# Patient Record
Sex: MALE | Race: BLACK OR AFRICAN AMERICAN | Employment: UNEMPLOYED | URBAN - METROPOLITAN AREA
[De-identification: names, ages, dates, MRNs, and addresses within clinical notes are randomized per-mention and may not be internally consistent; named-entity substitution may affect disease eponyms.]

---

## 2024-04-13 ENCOUNTER — HOSPITAL ENCOUNTER (EMERGENCY)
Age: 19
Discharge: HOME | End: 2024-04-13
Attending: EMERGENCY MEDICINE
Payer: COMMERCIAL

## 2024-04-13 VITALS
RESPIRATION RATE: 16 BRPM | HEART RATE: 84 BPM | SYSTOLIC BLOOD PRESSURE: 134 MMHG | HEIGHT: 72 IN | BODY MASS INDEX: 33.53 KG/M2 | WEIGHT: 247.58 LBS | OXYGEN SATURATION: 99 % | TEMPERATURE: 99.3 F | DIASTOLIC BLOOD PRESSURE: 64 MMHG

## 2024-04-13 DIAGNOSIS — Z76.0 ENCOUNTER FOR MEDICATION REFILL: Primary | ICD-10-CM

## 2024-04-13 PROCEDURE — 99283 EMERGENCY DEPT VISIT LOW MDM: CPT

## 2024-04-13 RX ORDER — BENZTROPINE MESYLATE 1 MG/1
1 TABLET ORAL 2 TIMES DAILY
Qty: 28 TABLET | Refills: 3 | Status: SHIPPED | OUTPATIENT
Start: 2024-04-13 | End: 2024-04-13

## 2024-04-13 RX ORDER — RISPERIDONE 3 MG/1
3 TABLET ORAL 2 TIMES DAILY
COMMUNITY
Start: 2023-07-24 | End: 2024-04-13

## 2024-04-13 RX ORDER — LAMOTRIGINE 100 MG/1
200 TABLET ORAL DAILY
Qty: 14 TABLET | Refills: 0 | Status: SHIPPED | OUTPATIENT
Start: 2024-04-13

## 2024-04-13 RX ORDER — HYDROXYZINE 50 MG/1
50 TABLET, FILM COATED ORAL EVERY 6 HOURS PRN
COMMUNITY
Start: 2024-01-19 | End: 2024-04-13

## 2024-04-13 RX ORDER — RISPERIDONE 3 MG/1
3 TABLET ORAL 2 TIMES DAILY
Qty: 28 TABLET | Refills: 0 | Status: SHIPPED | OUTPATIENT
Start: 2024-04-13 | End: 2024-04-13

## 2024-04-13 RX ORDER — HYDROXYZINE 50 MG/1
50 TABLET, FILM COATED ORAL EVERY 6 HOURS PRN
Qty: 30 TABLET | Refills: 0 | Status: SHIPPED | OUTPATIENT
Start: 2024-04-13

## 2024-04-13 RX ORDER — ARIPIPRAZOLE 5 MG/1
10 TABLET ORAL DAILY
Qty: 14 TABLET | Refills: 0 | Status: SHIPPED | OUTPATIENT
Start: 2024-04-13

## 2024-04-13 RX ORDER — DIVALPROEX SODIUM 500 MG/1
500 TABLET, DELAYED RELEASE ORAL 3 TIMES DAILY
COMMUNITY
Start: 2023-07-24 | End: 2024-04-13

## 2024-04-13 RX ORDER — LITHIUM CARBONATE 300 MG/1
300 CAPSULE ORAL 2 TIMES DAILY WITH MEALS
COMMUNITY
Start: 2023-07-24 | End: 2024-04-13

## 2024-04-13 RX ORDER — LITHIUM CARBONATE 300 MG/1
300 CAPSULE ORAL 2 TIMES DAILY WITH MEALS
Qty: 28 CAPSULE | Refills: 0 | Status: SHIPPED | OUTPATIENT
Start: 2024-04-13

## 2024-04-13 RX ORDER — DIVALPROEX SODIUM 500 MG/1
500 TABLET, DELAYED RELEASE ORAL 3 TIMES DAILY
Qty: 42 TABLET | Refills: 0 | Status: SHIPPED | OUTPATIENT
Start: 2024-04-13 | End: 2024-04-13

## 2024-04-13 NOTE — ED TRIAGE NOTES
Amilify 10 mg; lithium 600 mg, limactal 200 mg.  Out x 3 days.    Patient to ED for medication refill.  Patient is alert and oriented with GCS 15.  Patient recently moved form [place], has hx Mental health and has been out of medications x 3 days.  Patient denies any SI or HI and just needs to have his medications refilled.  No complaints at this time.

## 2024-04-19 NOTE — ED PROVIDER NOTES
[facility]  EMERGENCY DEPARTMENT ENCOUNTER        Pt Name: Rashid Foley  MRN: [id number]  Birthdate 2005  Date of evaluation: 4/13/2024  Provider: Abhinav Cain MD  PCP: No primary care provider on file.  Note Started: 2:24 AM EDT 4/19/24    CHIEF COMPLAINT       Chief Complaint   Patient presents with    Medication Refill       HISTORY OF PRESENT ILLNESS: 1 or more Elements   History From: Patient        Rashid Foley is a 18 y.o. male who presents for evaluation of medication refill.  Patient recent moved from [place] to [place].  He states that he is taking Abilify, lithium Lamictal.  He states he did not establish care with a provider here.  He did not speak to his previous provider to see if they given referral.  He denies thoughts of self-harm.  Denies auditory visualizations.  He reports he does feel safe at home.     Nursing Notes were all reviewed and agreed with or any disagreements were addressed in the HPI.    REVIEW OF SYSTEMS :      Review of Systems    Positives and Pertinent negatives as per HPI.     SURGICAL HISTORY   History reviewed. No pertinent surgical history.    CURRENTMEDICATIONS       Discharge Medication List as of 4/13/2024  8:10 PM          ALLERGIES     Patient has no known allergies.    FAMILYHISTORY     History reviewed. No pertinent family history.     SOCIAL HISTORY       Social History     Tobacco Use    Smoking status: Every Day     Current packs/day: 0.50     Types: Cigarettes    Smokeless tobacco: Never   Substance Use Topics    Alcohol use: Not Currently    Drug use: Never       SCREENINGS        Librado Coma Scale  Eye Opening: Spontaneous  Best Verbal Response: Oriented  Best Motor Response: Obeys commands  Librado Coma Scale Score: 15                CIWA Assessment  BP: 134/64  Pulse: 84           PHYSICAL EXAM  1 or more Elements     ED Triage Vitals   BP Temp Temp Source Pulse Respirations SpO2 Height Weight - Scale   04/13/24 3522

## 2024-07-13 PROCEDURE — 80307 DRUG TEST PRSMV CHEM ANLYZR: CPT | Mod: OUT | Performed by: NURSE PRACTITIONER

## 2024-07-13 PROCEDURE — 80355 GABAPENTIN NON-BLOOD: CPT | Mod: OUT | Performed by: NURSE PRACTITIONER

## 2024-07-14 ENCOUNTER — LAB REQUISITION (OUTPATIENT)
Dept: LAB | Facility: HOSPITAL | Age: 19
End: 2024-07-14
Payer: COMMERCIAL

## 2024-07-14 ENCOUNTER — PHARMACY VISIT (OUTPATIENT)
Dept: PHARMACY | Facility: CLINIC | Age: 19
End: 2024-07-14
Payer: MEDICAID

## 2024-07-14 DIAGNOSIS — Z20.828 CONTACT WITH AND (SUSPECTED) EXPOSURE TO OTHER VIRAL COMMUNICABLE DISEASES: ICD-10-CM

## 2024-07-14 DIAGNOSIS — Z00.00 ENCOUNTER FOR GENERAL ADULT MEDICAL EXAMINATION WITHOUT ABNORMAL FINDINGS: ICD-10-CM

## 2024-07-14 LAB
AMPHETAMINES UR QL SCN: ABNORMAL
BARBITURATES UR QL SCN: ABNORMAL
BENZODIAZ UR QL SCN: ABNORMAL
BZE UR QL SCN: ABNORMAL
CANNABINOIDS UR QL SCN: ABNORMAL
FENTANYL+NORFENTANYL UR QL SCN: ABNORMAL
METHADONE UR QL SCN: ABNORMAL
OPIATES UR QL SCN: ABNORMAL
OXYCODONE+OXYMORPHONE UR QL SCN: ABNORMAL
PCP UR QL SCN: ABNORMAL

## 2024-07-14 PROCEDURE — RXMED WILLOW AMBULATORY MEDICATION CHARGE

## 2024-07-14 RX ORDER — LORAZEPAM 1 MG/1
TABLET ORAL
Qty: 12 TABLET | Refills: 0 | OUTPATIENT
Start: 2024-07-14

## 2024-07-14 RX ORDER — DIPHENHYDRAMINE HCL 25 MG
CAPSULE ORAL
Qty: 12 CAPSULE | Refills: 0 | OUTPATIENT
Start: 2024-07-14

## 2024-07-14 RX ORDER — CHLORDIAZEPOXIDE HYDROCHLORIDE 25 MG/1
CAPSULE, GELATIN COATED ORAL
Qty: 22 CAPSULE | Refills: 0 | OUTPATIENT
Start: 2024-07-14

## 2024-07-14 RX ORDER — CLONIDINE HYDROCHLORIDE 0.1 MG/1
TABLET ORAL
Qty: 20 TABLET | Refills: 0 | OUTPATIENT
Start: 2024-07-14

## 2024-07-14 RX ORDER — ONDANSETRON 4 MG/1
TABLET, ORALLY DISINTEGRATING ORAL
Qty: 10 TABLET | Refills: 0 | OUTPATIENT
Start: 2024-07-14

## 2024-07-14 RX ORDER — TRAZODONE HYDROCHLORIDE 50 MG/1
TABLET ORAL
Qty: 10 TABLET | Refills: 0 | OUTPATIENT
Start: 2024-07-14

## 2024-07-14 RX ORDER — DICYCLOMINE HYDROCHLORIDE 20 MG/1
TABLET ORAL
Qty: 10 TABLET | Refills: 0 | OUTPATIENT
Start: 2024-07-14

## 2024-07-14 RX ORDER — LANOLIN ALCOHOL/MO/W.PET/CERES
CREAM (GRAM) TOPICAL
Qty: 60 TABLET | Refills: 0 | OUTPATIENT
Start: 2024-07-14

## 2024-07-14 RX ORDER — HYDROXYZINE HYDROCHLORIDE 50 MG/1
TABLET, FILM COATED ORAL
Qty: 30 TABLET | Refills: 0 | OUTPATIENT
Start: 2024-07-14

## 2024-07-14 RX ORDER — MICONAZOLE NITRATE 2 %
CREAM (GRAM) TOPICAL
Qty: 20 EACH | Refills: 0 | OUTPATIENT
Start: 2024-07-14

## 2024-07-14 RX ORDER — PROMETHAZINE HYDROCHLORIDE 12.5 MG/1
TABLET ORAL
Qty: 20 TABLET | Refills: 0 | OUTPATIENT
Start: 2024-07-14

## 2024-07-14 RX ORDER — FOLIC ACID 1 MG/1
TABLET ORAL
Qty: 60 TABLET | Refills: 0 | OUTPATIENT
Start: 2024-07-14

## 2024-07-16 LAB
BUPRENORPHINE SCREEN, URINE: NEGATIVE NG/ML
DRUG SCREEN COMMENT UR-IMP: NORMAL

## 2024-07-19 LAB — GABAPENTIN UR-MCNC: <5 UG/ML

## 2024-07-31 ENCOUNTER — HOSPITAL ENCOUNTER (EMERGENCY)
Age: 19
Discharge: PSYCHIATRIC OR SUBSTANCE USE FACILITY | End: 2024-08-01
Attending: EMERGENCY MEDICINE
Payer: COMMERCIAL

## 2024-07-31 DIAGNOSIS — R45.851 DEPRESSION WITH SUICIDAL IDEATION: Primary | ICD-10-CM

## 2024-07-31 DIAGNOSIS — F32.A DEPRESSION WITH SUICIDAL IDEATION: Primary | ICD-10-CM

## 2024-07-31 LAB
ALBUMIN SERPL-MCNC: 3.9 G/DL (ref 3.5–5.2)
ALP SERPL-CCNC: 109 U/L (ref 40–129)
ALT SERPL-CCNC: 23 U/L (ref 0–40)
AMPHET UR QL SCN: NEGATIVE
ANION GAP SERPL CALCULATED.3IONS-SCNC: 11 MMOL/L (ref 7–16)
APAP SERPL-MCNC: <5 UG/ML (ref 10–30)
AST SERPL-CCNC: 23 U/L (ref 0–39)
BARBITURATES UR QL SCN: NEGATIVE
BASOPHILS # BLD: 0.04 K/UL (ref 0–0.2)
BASOPHILS NFR BLD: 1 % (ref 0–2)
BENZODIAZ UR QL: POSITIVE
BILIRUB SERPL-MCNC: 0.4 MG/DL (ref 0–1.2)
BILIRUB UR QL STRIP: NEGATIVE
BUN SERPL-MCNC: 9 MG/DL (ref 6–20)
BUPRENORPHINE UR QL: NEGATIVE
CALCIUM SERPL-MCNC: 9 MG/DL (ref 8.6–10.2)
CANNABINOIDS UR QL SCN: POSITIVE
CHLORIDE SERPL-SCNC: 105 MMOL/L (ref 98–107)
CLARITY UR: CLEAR
CO2 SERPL-SCNC: 23 MMOL/L (ref 22–29)
COCAINE UR QL SCN: NEGATIVE
COLOR UR: YELLOW
CREAT SERPL-MCNC: 1 MG/DL (ref 0.7–1.2)
EKG ATRIAL RATE: 58 BPM
EKG P AXIS: 37 DEGREES
EKG P-R INTERVAL: 136 MS
EKG Q-T INTERVAL: 404 MS
EKG QRS DURATION: 76 MS
EKG QTC CALCULATION (BAZETT): 396 MS
EKG R AXIS: 88 DEGREES
EKG T AXIS: 51 DEGREES
EKG VENTRICULAR RATE: 58 BPM
EOSINOPHIL # BLD: 0.02 K/UL (ref 0.05–0.5)
EOSINOPHILS RELATIVE PERCENT: 0 % (ref 0–6)
ERYTHROCYTE [DISTWIDTH] IN BLOOD BY AUTOMATED COUNT: 14.3 % (ref 11.5–15)
ETHANOLAMINE SERPL-MCNC: <10 MG/DL (ref 0–0.08)
FENTANYL UR QL: NEGATIVE
GFR, ESTIMATED: >90 ML/MIN/1.73M2
GLUCOSE SERPL-MCNC: 95 MG/DL (ref 74–99)
GLUCOSE UR STRIP-MCNC: NEGATIVE MG/DL
HCT VFR BLD AUTO: 42.5 % (ref 37–54)
HGB BLD-MCNC: 13.3 G/DL (ref 12.5–16.5)
HGB UR QL STRIP.AUTO: NEGATIVE
IMM GRANULOCYTES # BLD AUTO: <0.03 K/UL (ref 0–0.58)
IMM GRANULOCYTES NFR BLD: 0 % (ref 0–5)
KETONES UR STRIP-MCNC: 15 MG/DL
LEUKOCYTE ESTERASE UR QL STRIP: NEGATIVE
LITHIUM DATE LAST DOSE: ABNORMAL
LITHIUM DOSE AMOUNT: ABNORMAL
LITHIUM DOSE TIME: ABNORMAL
LITHIUM LEVEL: <0.1 MMOL/L (ref 0.5–1.5)
LYMPHOCYTES NFR BLD: 1.68 K/UL (ref 1.5–4)
LYMPHOCYTES RELATIVE PERCENT: 28 % (ref 20–42)
MCH RBC QN AUTO: 26.1 PG (ref 26–35)
MCHC RBC AUTO-ENTMCNC: 31.3 G/DL (ref 32–34.5)
MCV RBC AUTO: 83.3 FL (ref 80–99.9)
METHADONE UR QL: NEGATIVE
MONOCYTES NFR BLD: 0.58 K/UL (ref 0.1–0.95)
MONOCYTES NFR BLD: 10 % (ref 2–12)
NEUTROPHILS NFR BLD: 61 % (ref 43–80)
NEUTS SEG NFR BLD: 3.58 K/UL (ref 1.8–7.3)
NITRITE UR QL STRIP: NEGATIVE
OPIATES UR QL SCN: NEGATIVE
OXYCODONE UR QL SCN: NEGATIVE
PCP UR QL SCN: NEGATIVE
PH UR STRIP: 6 [PH] (ref 5–9)
PLATELET # BLD AUTO: 289 K/UL (ref 130–450)
PMV BLD AUTO: 10.3 FL (ref 7–12)
POTASSIUM SERPL-SCNC: 4.3 MMOL/L (ref 3.5–5)
PROT SERPL-MCNC: 7.2 G/DL (ref 6.4–8.3)
PROT UR STRIP-MCNC: 30 MG/DL
RBC # BLD AUTO: 5.1 M/UL (ref 3.8–5.8)
RBC #/AREA URNS HPF: NORMAL /HPF
SALICYLATES SERPL-MCNC: <0.3 MG/DL (ref 0–30)
SODIUM SERPL-SCNC: 139 MMOL/L (ref 132–146)
SP GR UR STRIP: >1.03 (ref 1–1.03)
TEST INFORMATION: ABNORMAL
TOXIC TRICYCLIC SC,BLOOD: NEGATIVE
UROBILINOGEN UR STRIP-ACNC: 1 EU/DL (ref 0–1)
WBC #/AREA URNS HPF: NORMAL /HPF
WBC OTHER # BLD: 5.9 K/UL (ref 4.5–11.5)

## 2024-07-31 PROCEDURE — 80143 DRUG ASSAY ACETAMINOPHEN: CPT

## 2024-07-31 PROCEDURE — 80307 DRUG TEST PRSMV CHEM ANLYZR: CPT

## 2024-07-31 PROCEDURE — G0480 DRUG TEST DEF 1-7 CLASSES: HCPCS

## 2024-07-31 PROCEDURE — 80179 DRUG ASSAY SALICYLATE: CPT

## 2024-07-31 PROCEDURE — 85025 COMPLETE CBC W/AUTO DIFF WBC: CPT

## 2024-07-31 PROCEDURE — 93005 ELECTROCARDIOGRAM TRACING: CPT | Performed by: EMERGENCY MEDICINE

## 2024-07-31 PROCEDURE — 80178 ASSAY OF LITHIUM: CPT

## 2024-07-31 PROCEDURE — 99285 EMERGENCY DEPT VISIT HI MDM: CPT

## 2024-07-31 PROCEDURE — 81001 URINALYSIS AUTO W/SCOPE: CPT

## 2024-07-31 PROCEDURE — 93010 ELECTROCARDIOGRAM REPORT: CPT | Performed by: INTERNAL MEDICINE

## 2024-07-31 PROCEDURE — 80053 COMPREHEN METABOLIC PANEL: CPT
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
## 2024-07-31 NOTE — ED PROVIDER NOTES
use/arrhythmia.  Workup noted.  Medically for social work eval for psych admit      -------------------------------------------------- RESULTS -------------------------------------------------  I have personally reviewed all laboratory and imaging results for this patient. Results are listed below.     LABS:  Results for orders placed or performed during the hospital encounter of 07/31/24   CBC with Auto Differential   Result Value Ref Range    WBC 5.9 4.5 - 11.5 k/uL    RBC 5.10 3.80 - 5.80 m/uL    Hemoglobin 13.3 12.5 - 16.5 g/dL    Hematocrit 42.5 37.0 - 54.0 %    MCV 83.3 80.0 - 99.9 fL    MCH 26.1 26.0 - 35.0 pg    MCHC 31.3 (L) 32.0 - 34.5 g/dL    RDW 14.3 11.5 - 15.0 %    Platelets 289 130 - 450 k/uL    MPV 10.3 7.0 - 12.0 fL    Neutrophils % 61 43.0 - 80.0 %    Lymphocytes % 28 20.0 - 42.0 %    Monocytes % 10 2.0 - 12.0 %    Eosinophils % 0 0 - 6 %    Basophils % 1 0.0 - 2.0 %    Immature Granulocytes % 0 0.0 - 5.0 %    Neutrophils Absolute 3.58 1.80 - 7.30 k/uL    Lymphocytes Absolute 1.68 1.50 - 4.00 k/uL    Monocytes Absolute 0.58 0.10 - 0.95 k/uL    Eosinophils Absolute 0.02 (L) 0.05 - 0.50 k/uL    Basophils Absolute 0.04 0.00 - 0.20 k/uL    Immature Granulocytes Absolute <0.03 0.00 - 0.58 k/uL   Comprehensive Metabolic Panel w/ Reflex to MG   Result Value Ref Range    Sodium 139 132 - 146 mmol/L    Potassium 4.3 3.5 - 5.0 mmol/L    Chloride 105 98 - 107 mmol/L    CO2 23 22 - 29 mmol/L    Anion Gap 11 7 - 16 mmol/L    Glucose 95 74 - 99 mg/dL    BUN 9 6 - 20 mg/dL    Creatinine 1.0 0.70 - 1.20 mg/dL    Est, Glom Filt Rate >90 >60 mL/min/1.73m2    Calcium 9.0 8.6 - 10.2 mg/dL    Total Protein 7.2 6.4 - 8.3 g/dL    Albumin 3.9 3.5 - 5.2 g/dL    Total Bilirubin 0.4 0.0 - 1.2 mg/dL    Alkaline Phosphatase 109 40 - 129 U/L    ALT 23 0 - 40 U/L    AST 23 0 - 39 U/L   Urinalysis   Result Value Ref Range    Color, UA Yellow Yellow    Turbidity UA Clear Clear    Glucose, Ur NEGATIVE NEGATIVE mg/dL    Bilirubin,

## 2024-07-31 NOTE — ED NOTES
spoke to [facility] Thien who was going to inquire on if patient had any belongings at [facility].     followed up with [employer] and confirmed patient was discharged and had all his belongings with him.

## 2024-07-31 NOTE — ED NOTES
Patient has been accepted to [facility] for [facility]ry by Dr Keenan     Room assigned upon arrival     [employer] is to be called to [phone]    Involuntary hold is to be faxed to # [phone]    Awaiting for transportation to be arranged

## 2024-08-01 VITALS
TEMPERATURE: 98 F | DIASTOLIC BLOOD PRESSURE: 66 MMHG | RESPIRATION RATE: 16 BRPM | HEART RATE: 76 BPM | OXYGEN SATURATION: 98 % | SYSTOLIC BLOOD PRESSURE: 125 MMHG

## 2024-08-01 PROCEDURE — 6370000000 HC RX 637 (ALT 250 FOR IP): Performed by: STUDENT IN AN ORGANIZED HEALTH CARE EDUCATION/TRAINING PROGRAM

## 2024-08-01 PROCEDURE — 6370000000 HC RX 637 (ALT 250 FOR IP): Performed by: EMERGENCY MEDICINE

## 2024-08-01 RX ORDER — QUETIAPINE FUMARATE 50 MG/1
50 TABLET, EXTENDED RELEASE ORAL NIGHTLY
COMMUNITY

## 2024-08-01 RX ORDER — QUETIAPINE FUMARATE 25 MG/1
50 TABLET, FILM COATED ORAL ONCE
Status: COMPLETED | OUTPATIENT
Start: 2024-08-01 | End: 2024-08-01

## 2024-08-01 RX ORDER — POLYETHYLENE GLYCOL 3350 17 G
2 POWDER IN PACKET (EA) ORAL ONCE
Status: COMPLETED | OUTPATIENT
Start: 2024-08-01 | End: 2024-08-01

## 2024-08-01 RX ADMIN — NICOTINE POLACRILEX 2 MG: 2 LOZENGE ORAL at 11:35

## 2024-08-01 RX ADMIN — QUETIAPINE FUMARATE 50 MG: 25 TABLET ORAL at 00:24

## 2024-08-01 NOTE — ED NOTES
Ifrah from access center called and PAS eta is 10am, outsource was requested but outsourcing is not available.